# Patient Record
Sex: MALE | Race: WHITE | NOT HISPANIC OR LATINO | Employment: FULL TIME | URBAN - METROPOLITAN AREA
[De-identification: names, ages, dates, MRNs, and addresses within clinical notes are randomized per-mention and may not be internally consistent; named-entity substitution may affect disease eponyms.]

---

## 2018-10-31 ENCOUNTER — HOSPITAL ENCOUNTER (EMERGENCY)
Facility: HOSPITAL | Age: 38
Discharge: HOME | End: 2018-10-31
Attending: EMERGENCY MEDICINE

## 2018-10-31 VITALS
WEIGHT: 260 LBS | TEMPERATURE: 99 F | BODY MASS INDEX: 39.4 KG/M2 | HEIGHT: 68 IN | HEART RATE: 102 BPM | OXYGEN SATURATION: 96 % | RESPIRATION RATE: 20 BRPM | SYSTOLIC BLOOD PRESSURE: 146 MMHG | DIASTOLIC BLOOD PRESSURE: 93 MMHG

## 2018-10-31 DIAGNOSIS — S61.209A FINGER AVULSION, INITIAL ENCOUNTER: Primary | ICD-10-CM

## 2018-10-31 PROCEDURE — 63600175 PHARM REV CODE 636 W HCPCS

## 2018-10-31 PROCEDURE — 90715 TDAP VACCINE 7 YRS/> IM: CPT

## 2018-10-31 PROCEDURE — 90471 IMMUNIZATION ADMIN: CPT

## 2018-10-31 PROCEDURE — 25000003 PHARM REV CODE 250: Performed by: NURSE PRACTITIONER

## 2018-10-31 PROCEDURE — 99283 EMERGENCY DEPT VISIT LOW MDM: CPT

## 2018-10-31 RX ORDER — IBUPROFEN 600 MG/1
600 TABLET ORAL
Status: COMPLETED | OUTPATIENT
Start: 2018-10-31 | End: 2018-10-31

## 2018-10-31 RX ORDER — CEPHALEXIN 250 MG/1
500 CAPSULE ORAL
Status: COMPLETED | OUTPATIENT
Start: 2018-10-31 | End: 2018-10-31

## 2018-10-31 RX ORDER — CEPHALEXIN 250 MG/1
CAPSULE ORAL
Status: DISCONTINUED
Start: 2018-10-31 | End: 2018-10-31 | Stop reason: HOSPADM

## 2018-10-31 RX ORDER — CEPHALEXIN 500 MG/1
500 CAPSULE ORAL 4 TIMES DAILY
Qty: 20 CAPSULE | Refills: 0 | Status: SHIPPED | OUTPATIENT
Start: 2018-10-31 | End: 2018-11-05

## 2018-10-31 RX ADMIN — CLOSTRIDIUM TETANI TOXOID ANTIGEN (FORMALDEHYDE INACTIVATED), CORYNEBACTERIUM DIPHTHERIAE TOXOID ANTIGEN (FORMALDEHYDE INACTIVATED), BORDETELLA PERTUSSIS TOXOID ANTIGEN (GLUTARALDEHYDE INACTIVATED), BORDETELLA PERTUSSIS FILAMENTOUS HEMAGGLUTININ ANTIGEN (FORMALDEHYDE INACTIVATED), BORDETELLA PERTUSSIS PERTACTIN ANTIGEN, AND BORDETELLA PERTUSSIS FIMBRIAE 2/3 ANTIGEN 0.5 ML: 5; 2; 2.5; 5; 3; 5 INJECTION, SUSPENSION INTRAMUSCULAR at 05:10

## 2018-10-31 RX ADMIN — IBUPROFEN 600 MG: 600 TABLET, FILM COATED ORAL at 06:10

## 2018-10-31 RX ADMIN — CEPHALEXIN 500 MG: 250 CAPSULE ORAL at 06:10

## 2018-10-31 NOTE — ED NOTES
Pt presented to room 13 with complaint of a laceration to the tip of his finger after being struck with a circular saw.  ABC's intact awake and alert with no distress noted.  No active bleeding at this time.

## 2018-10-31 NOTE — ED PROVIDER NOTES
Encounter Date: 10/31/2018    SCRIBE #1 NOTE: I, Patty Begum, am scribing for, and in the presence of, Jeri Redmond NP.       History     Chief Complaint   Patient presents with    Laceration     Cut left index finger with chop saw.       Time seen by provider: 5:42 PM on 10/31/2018    Dion Hong Jr. is a 38 y.o. male who presents to the ED with an onset of a wound on his left index finger that presented ~1.45 hours ago. He was cutting trim to put on a house when the saw fell on to his finger. The patient denies any other symptoms at this time. His tetanus vaccination is not up to date. No pertinent SHx noted. No known drug allergies noted.      The history is provided by the patient.     Review of patient's allergies indicates:  No Known Allergies  No past medical history on file.  History reviewed. No pertinent surgical history.  History reviewed. No pertinent family history.  Social History     Tobacco Use    Smoking status: Never Smoker   Substance Use Topics    Alcohol use: No     Frequency: Never    Drug use: No     Review of Systems   Constitutional: Negative for fever.   HENT: Negative for sore throat.    Respiratory: Negative for shortness of breath.    Cardiovascular: Negative for chest pain.   Gastrointestinal: Negative for nausea.   Genitourinary: Negative for dysuria.   Musculoskeletal: Negative for back pain.   Skin: Positive for wound. Negative for rash.        Positive for wound on left index finger.   Neurological: Negative for weakness.   Hematological: Does not bruise/bleed easily.       Physical Exam     Initial Vitals   BP Pulse Resp Temp SpO2   10/31/18 1736 10/31/18 1736 10/31/18 1736 10/31/18 1735 10/31/18 1736   (!) 146/93 102 20 98.9 °F (37.2 °C) 96 %      MAP       --                Physical Exam    Nursing note and vitals reviewed.  Constitutional: Vital signs are normal. He appears well-developed and well-nourished.   HENT:   Head: Normocephalic and atraumatic.   Eyes:  Pupils are equal, round, and reactive to light.   Neck: Neck supple.   Cardiovascular: Normal rate, regular rhythm, normal heart sounds and intact distal pulses. Exam reveals no gallop and no friction rub.    No murmur heard.  Pulmonary/Chest: Breath sounds normal. He has no wheezes. He has no rhonchi. He has no rales.   Abdominal: Normal appearance.   Musculoskeletal:        Left hand: He exhibits tenderness. He exhibits normal range of motion, normal two-point discrimination, normal capillary refill, no deformity and no swelling. Normal sensation noted.   Approximately 0.5 cm Avulsion injury to pulp of the left index finger. No visible bone or tendon involvement. Adequate hemostasis.  Not involving the nail and not circumferential. Normal flexion and extension of left index figner. No swelling. No visible or palpable foreign body. See image attached.    Neurological: He is alert and oriented to person, place, and time. He has normal strength.   Skin: Skin is warm, dry and intact. Capillary refill takes less than 2 seconds.   Psychiatric: He has a normal mood and affect. His speech is normal and behavior is normal.         ED Course   Procedures  Labs Reviewed - No data to display       Imaging Results          X-Ray Finger 2 or More Views Left (Final result)  Result time 11/01/18 09:29:27    Final result by Vishnu Lucio Jr., MD (11/01/18 09:29:27)                 Impression:      Soft tissue loss from the tip of the left index finger without fracture or loss at the digital tuft.      Electronically signed by: Vishnu Lucio MD  Date:    11/01/2018  Time:    09:29             Narrative:    EXAMINATION:  XR FINGER 2 OR MORE VIEWS LEFT    CLINICAL HISTORY:  laceration;    TECHNIQUE:  Three views of the left index finger are provided.    COMPARISON:  None    FINDINGS:  There is soft tissue injury to the tip of the left index finger.  Bone loss or fracture of the digital tuft is not seen.  No foreign body is  noted.                                 Medical Decision Making:   History:   Old Medical Records: I decided to obtain old medical records.  Differential Diagnosis:   Laceration  Tuft fracture  Avulsion   Clinical Tests:   Radiological Study: Ordered and Reviewed       APC / Resident Notes:   Patient is a 38 y.o. male who presents to the ED 11/01/2018 who underwent emergent evaluation for avulsion to left index finger without bone or tendon or nail bed involvement. Adequate hemostasis is obtained. No visible or palpable FB. Xray of left index finger without tuft fracture or bony injury noted. Patient has normal flexion and extension of finger with normal strength and sensation and < 2 second capillary refill; there are no signs of neurovascular compromise or infection. Patient is initiated on Keflex prophylactically in ED; I do no think emergent IV antiobiotics are indicated at this time. Wound is thoroughly cleansed and wound glue is applied per patient request to protect wound bed. Discussed with patient that there will be scarring; he verbalized understanding. Tetanus is updated and patient is given antiinflammatories for pain provided in ED. Antibiotic ointment and protective splint is applied to left index finger tip. Patient is referred to orthopedic.  Based on my clinical evaluation, I do not appreciate any immediate, emergent, or life threatening condition or etiology that warrants additional workup today and feel that the patient can be discharged with close follow up care with orthopedic.  Case discussed with Dr. Cortes who also evaluated patient and who is agreeable to plan of care. Follow up and return precautions discussed; patient verbalized understanding and is agreeable to plan of care. Patient discharged home in stable condition.               Scribe Attestation:   Scribe #1: I performed the above scribed service and the documentation accurately describes the services I performed. I attest to the  accuracy of the note.    Attending Attestation:           Physician Attestation for Scribe:  Physician Attestation Statement for Scribe #1: I, Jeri Redmond, reviewed documentation, as scribed by in my presence, and it is both accurate and complete.     Comments: I, PATTI Baltazar, personally performed the services described in this documentation. All medical record entries made by the scribe were at my direction and in my presence.  I have reviewed the chart and agree that the record reflects my personal performance and is accurate and complete. PATTI Baltazar.  11:58 AM 11/01/2018                Clinical Impression:   The encounter diagnosis was Finger avulsion, initial encounter.      Disposition:   Disposition: Discharged  Condition: Stable                        Jeri Redmond NP  11/01/18 1159

## 2019-04-03 ENCOUNTER — HOSPITAL ENCOUNTER (INPATIENT)
Facility: HOSPITAL | Age: 39
LOS: 1 days | Discharge: HOME | DRG: 603 | End: 2019-04-04
Attending: EMERGENCY MEDICINE | Admitting: INTERNAL MEDICINE

## 2019-04-03 DIAGNOSIS — M25.569 KNEE PAIN: ICD-10-CM

## 2019-04-03 DIAGNOSIS — R50.9 FEVER: ICD-10-CM

## 2019-04-03 DIAGNOSIS — L03.116 CELLULITIS OF LEFT LOWER EXTREMITY: Primary | ICD-10-CM

## 2019-04-03 PROBLEM — A41.9 SEPSIS: Status: ACTIVE | Noted: 2019-04-03

## 2019-04-03 PROBLEM — E66.09 CLASS 2 OBESITY DUE TO EXCESS CALORIES WITHOUT SERIOUS COMORBIDITY WITH BODY MASS INDEX (BMI) OF 39.0 TO 39.9 IN ADULT: Status: ACTIVE | Noted: 2019-04-03

## 2019-04-03 PROBLEM — E66.01 MORBID OBESITY WITH BMI OF 40.0-44.9, ADULT: Status: ACTIVE | Noted: 2019-04-03

## 2019-04-03 LAB
ANION GAP SERPL CALC-SCNC: 11 MMOL/L (ref 8–16)
BACTERIA #/AREA URNS HPF: ABNORMAL /HPF
BASOPHILS # BLD AUTO: 0 K/UL (ref 0–0.2)
BASOPHILS NFR BLD: 0.3 % (ref 0–1.9)
BILIRUB UR QL STRIP: NEGATIVE
BUN SERPL-MCNC: 13 MG/DL (ref 6–20)
CALCIUM SERPL-MCNC: 9.8 MG/DL (ref 8.7–10.5)
CHLORIDE SERPL-SCNC: 102 MMOL/L (ref 95–110)
CLARITY UR: CLEAR
CO2 SERPL-SCNC: 25 MMOL/L (ref 23–29)
COLOR UR: YELLOW
CREAT SERPL-MCNC: 0.8 MG/DL (ref 0.5–1.4)
DIFFERENTIAL METHOD: ABNORMAL
EOSINOPHIL # BLD AUTO: 0 K/UL (ref 0–0.5)
EOSINOPHIL NFR BLD: 0.4 % (ref 0–8)
ERYTHROCYTE [DISTWIDTH] IN BLOOD BY AUTOMATED COUNT: 12.9 % (ref 11.5–14.5)
EST. GFR  (AFRICAN AMERICAN): >60 ML/MIN/1.73 M^2
EST. GFR  (NON AFRICAN AMERICAN): >60 ML/MIN/1.73 M^2
GLUCOSE SERPL-MCNC: 69 MG/DL (ref 70–110)
GLUCOSE UR QL STRIP: NEGATIVE
HCT VFR BLD AUTO: 43 % (ref 40–54)
HGB BLD-MCNC: 14.2 G/DL (ref 14–18)
HGB UR QL STRIP: ABNORMAL
KETONES UR QL STRIP: NEGATIVE
LACTATE SERPL-SCNC: 1.4 MMOL/L (ref 0.5–2.2)
LEUKOCYTE ESTERASE UR QL STRIP: NEGATIVE
LYMPHOCYTES # BLD AUTO: 2 K/UL (ref 1–4.8)
LYMPHOCYTES NFR BLD: 21.1 % (ref 18–48)
MCH RBC QN AUTO: 29.8 PG (ref 27–31)
MCHC RBC AUTO-ENTMCNC: 33 G/DL (ref 32–36)
MCV RBC AUTO: 91 FL (ref 82–98)
MICROSCOPIC COMMENT: ABNORMAL
MONOCYTES # BLD AUTO: 1 K/UL (ref 0.3–1)
MONOCYTES NFR BLD: 10.1 % (ref 4–15)
NEUTROPHILS # BLD AUTO: 6.5 K/UL (ref 1.8–7.7)
NEUTROPHILS NFR BLD: 68.1 % (ref 38–73)
NITRITE UR QL STRIP: NEGATIVE
PH UR STRIP: 6 [PH] (ref 5–8)
PLATELET # BLD AUTO: 270 K/UL (ref 150–350)
PMV BLD AUTO: 7.1 FL (ref 9.2–12.9)
POTASSIUM SERPL-SCNC: 3.6 MMOL/L (ref 3.5–5.1)
PROT UR QL STRIP: NEGATIVE
RBC # BLD AUTO: 4.75 M/UL (ref 4.6–6.2)
RBC #/AREA URNS HPF: 5 /HPF (ref 0–4)
SODIUM SERPL-SCNC: 138 MMOL/L (ref 136–145)
SP GR UR STRIP: 1.02 (ref 1–1.03)
SQUAMOUS #/AREA URNS HPF: 1 /HPF
URN SPEC COLLECT METH UR: ABNORMAL
UROBILINOGEN UR STRIP-ACNC: NEGATIVE EU/DL
WBC # BLD AUTO: 9.5 K/UL (ref 3.9–12.7)
WBC #/AREA URNS HPF: 0 /HPF (ref 0–5)

## 2019-04-03 PROCEDURE — 36415 COLL VENOUS BLD VENIPUNCTURE: CPT

## 2019-04-03 PROCEDURE — 25000003 PHARM REV CODE 250: Performed by: NURSE PRACTITIONER

## 2019-04-03 PROCEDURE — 83605 ASSAY OF LACTIC ACID: CPT

## 2019-04-03 PROCEDURE — 93005 ELECTROCARDIOGRAM TRACING: CPT

## 2019-04-03 PROCEDURE — 63600175 PHARM REV CODE 636 W HCPCS: Performed by: EMERGENCY MEDICINE

## 2019-04-03 PROCEDURE — 86140 C-REACTIVE PROTEIN: CPT

## 2019-04-03 PROCEDURE — 87040 BLOOD CULTURE FOR BACTERIA: CPT | Mod: 59

## 2019-04-03 PROCEDURE — 25000003 PHARM REV CODE 250: Performed by: EMERGENCY MEDICINE

## 2019-04-03 PROCEDURE — 85025 COMPLETE CBC W/AUTO DIFF WBC: CPT

## 2019-04-03 PROCEDURE — 12000002 HC ACUTE/MED SURGE SEMI-PRIVATE ROOM

## 2019-04-03 PROCEDURE — 81000 URINALYSIS NONAUTO W/SCOPE: CPT

## 2019-04-03 PROCEDURE — 96374 THER/PROPH/DIAG INJ IV PUSH: CPT

## 2019-04-03 PROCEDURE — 80048 BASIC METABOLIC PNL TOTAL CA: CPT

## 2019-04-03 PROCEDURE — 99285 EMERGENCY DEPT VISIT HI MDM: CPT | Mod: 25

## 2019-04-03 PROCEDURE — 63600175 PHARM REV CODE 636 W HCPCS: Performed by: NURSE PRACTITIONER

## 2019-04-03 RX ORDER — GLUCAGON 1 MG
1 KIT INJECTION
Status: DISCONTINUED | OUTPATIENT
Start: 2019-04-03 | End: 2019-04-04 | Stop reason: HOSPADM

## 2019-04-03 RX ORDER — POTASSIUM CHLORIDE 20 MEQ/15ML
40 SOLUTION ORAL
Status: DISCONTINUED | OUTPATIENT
Start: 2019-04-03 | End: 2019-04-04 | Stop reason: HOSPADM

## 2019-04-03 RX ORDER — ONDANSETRON 2 MG/ML
8 INJECTION INTRAMUSCULAR; INTRAVENOUS EVERY 8 HOURS PRN
Status: DISCONTINUED | OUTPATIENT
Start: 2019-04-03 | End: 2019-04-04 | Stop reason: HOSPADM

## 2019-04-03 RX ORDER — SODIUM CHLORIDE 0.9 % (FLUSH) 0.9 %
10 SYRINGE (ML) INJECTION
Status: DISCONTINUED | OUTPATIENT
Start: 2019-04-03 | End: 2019-04-04 | Stop reason: HOSPADM

## 2019-04-03 RX ORDER — LANOLIN ALCOHOL/MO/W.PET/CERES
800 CREAM (GRAM) TOPICAL
Status: DISCONTINUED | OUTPATIENT
Start: 2019-04-03 | End: 2019-04-04 | Stop reason: HOSPADM

## 2019-04-03 RX ORDER — ENOXAPARIN SODIUM 100 MG/ML
40 INJECTION SUBCUTANEOUS EVERY 24 HOURS
Status: DISCONTINUED | OUTPATIENT
Start: 2019-04-03 | End: 2019-04-04 | Stop reason: HOSPADM

## 2019-04-03 RX ORDER — VANCOMYCIN HCL IN 5 % DEXTROSE 1G/250ML
1000 PLASTIC BAG, INJECTION (ML) INTRAVENOUS
Status: DISPENSED | OUTPATIENT
Start: 2019-04-03 | End: 2019-04-04

## 2019-04-03 RX ORDER — ACETAMINOPHEN 325 MG/1
650 TABLET ORAL
Status: COMPLETED | OUTPATIENT
Start: 2019-04-03 | End: 2019-04-03

## 2019-04-03 RX ORDER — AMOXICILLIN 250 MG
1 CAPSULE ORAL 2 TIMES DAILY
Status: DISCONTINUED | OUTPATIENT
Start: 2019-04-03 | End: 2019-04-04 | Stop reason: HOSPADM

## 2019-04-03 RX ORDER — IBUPROFEN 400 MG/1
400 TABLET ORAL EVERY 6 HOURS PRN
Status: DISCONTINUED | OUTPATIENT
Start: 2019-04-03 | End: 2019-04-04 | Stop reason: HOSPADM

## 2019-04-03 RX ORDER — SODIUM CHLORIDE 9 MG/ML
1000 INJECTION, SOLUTION INTRAVENOUS
Status: COMPLETED | OUTPATIENT
Start: 2019-04-03 | End: 2019-04-03

## 2019-04-03 RX ORDER — ACETAMINOPHEN 500 MG
1000 TABLET ORAL EVERY 6 HOURS PRN
Status: DISCONTINUED | OUTPATIENT
Start: 2019-04-03 | End: 2019-04-04 | Stop reason: HOSPADM

## 2019-04-03 RX ORDER — SODIUM CHLORIDE 9 MG/ML
INJECTION, SOLUTION INTRAVENOUS CONTINUOUS
Status: DISCONTINUED | OUTPATIENT
Start: 2019-04-03 | End: 2019-04-04 | Stop reason: HOSPADM

## 2019-04-03 RX ORDER — IBUPROFEN 200 MG
16 TABLET ORAL
Status: DISCONTINUED | OUTPATIENT
Start: 2019-04-03 | End: 2019-04-04 | Stop reason: HOSPADM

## 2019-04-03 RX ORDER — IBUPROFEN 200 MG
24 TABLET ORAL
Status: DISCONTINUED | OUTPATIENT
Start: 2019-04-03 | End: 2019-04-04 | Stop reason: HOSPADM

## 2019-04-03 RX ORDER — POTASSIUM CHLORIDE 20 MEQ/15ML
60 SOLUTION ORAL
Status: DISCONTINUED | OUTPATIENT
Start: 2019-04-03 | End: 2019-04-04 | Stop reason: HOSPADM

## 2019-04-03 RX ADMIN — SODIUM CHLORIDE 1000 ML: 0.9 INJECTION, SOLUTION INTRAVENOUS at 07:04

## 2019-04-03 RX ADMIN — ACETAMINOPHEN 650 MG: 325 TABLET ORAL at 07:04

## 2019-04-03 RX ADMIN — CEFTRIAXONE 1 G: 1 INJECTION, SOLUTION INTRAVENOUS at 10:04

## 2019-04-03 RX ADMIN — ENOXAPARIN SODIUM 40 MG: 100 INJECTION SUBCUTANEOUS at 09:04

## 2019-04-03 RX ADMIN — SODIUM CHLORIDE: 0.9 INJECTION, SOLUTION INTRAVENOUS at 08:04

## 2019-04-03 NOTE — ED PROVIDER NOTES
"Encounter Date: 4/3/2019    SCRIBE #1 NOTE: I, Catherine Leon , am scribing for, and in the presence of,  Dr. Cuello. I have scribed the entire note.       History     Chief Complaint   Patient presents with    Cellulitis     left knee x 1 week      04/03/2019  6:45 PM       The patient is a 39 y.o. male who is presenting with the gradual onset of cellulitis to the LLE that began x 1 week ago s/p "kneeling down" on something at a construction site. The pt endorses worsening erythema, swelling, and tenderness that began just inferior to his L knee and has since spread to the distal LLE. No mitigaitng or exacerbating factors. Associated sxs include chills, nausea, fever and body aches. No pertinent PMhx. No pertinent past surgical hx.               The history is provided by the patient and medical records.     Review of patient's allergies indicates:  No Known Allergies  History reviewed. No pertinent past medical history.  History reviewed. No pertinent surgical history.  History reviewed. No pertinent family history.  Social History     Tobacco Use    Smoking status: Never Smoker   Substance Use Topics    Alcohol use: No     Frequency: Never    Drug use: No     Review of Systems   Constitutional: Positive for chills and fever. Negative for activity change, appetite change and fatigue.   Eyes: Negative for visual disturbance.   Respiratory: Negative for apnea and shortness of breath.    Cardiovascular: Negative for chest pain and palpitations.   Gastrointestinal: Positive for nausea. Negative for abdominal distention and abdominal pain.   Genitourinary: Negative for difficulty urinating.   Musculoskeletal: Positive for myalgias. Negative for neck pain.   Skin: Positive for color change and wound. Negative for pallor and rash.        +swelling      Neurological: Negative for headaches.   Hematological: Does not bruise/bleed easily.   Psychiatric/Behavioral: Negative for agitation.       Physical Exam     Initial " Vitals [04/03/19 1752]   BP Pulse Resp Temp SpO2   132/77 (!) 127 20 (!) 101.1 °F (38.4 °C) 96 %      MAP       --         Physical Exam    Nursing note and vitals reviewed.  Constitutional: He appears well-developed and well-nourished.   HENT:   Head: Normocephalic and atraumatic.   Eyes: Conjunctivae and EOM are normal. Pupils are equal, round, and reactive to light.   Neck: Normal range of motion. Neck supple.   Cardiovascular: Regular rhythm, normal heart sounds and intact distal pulses. Exam reveals no gallop and no friction rub.    No murmur heard.  Tachycardic rate    Pulmonary/Chest: Breath sounds normal. No respiratory distress. He has no wheezes. He has no rhonchi. He has no rales.   Abdominal: Soft. He exhibits no distension. There is no tenderness.   Musculoskeletal: Normal range of motion. He exhibits tenderness. He exhibits no edema.   Neurological: He is alert and oriented to person, place, and time.   Skin: Skin is warm and dry. There is erythema.   2 by 2cm tender nodule present just overlying the L patellar tendon. There is centralized area of umbilicated fluctuance noted with erythema and calor present inferior to the LLE, sparing the thigh.    Psychiatric: He has a normal mood and affect.         ED Course   Procedures  Labs Reviewed   BASIC METABOLIC PANEL - Abnormal; Notable for the following components:       Result Value    Glucose 69 (*)     All other components within normal limits   CBC W/ AUTO DIFFERENTIAL - Abnormal; Notable for the following components:    MPV 7.1 (*)     All other components within normal limits   CULTURE, BLOOD   CULTURE, BLOOD   LACTIC ACID, PLASMA   URINALYSIS, REFLEX TO URINE CULTURE     EKG Readings: (Independently Interpreted)   Heart Rate: 119.   Sinus tachycardia. Normal axis, normal intervals. No ST segment elevation or depression. No T wave inversions.        Imaging Results          X-Ray Knee 3 View Left (In process)                X-Ray Chest AP Portable  (In process)                  Medical Decision Making:   History:   Old Medical Records: I decided to obtain old medical records.  Independently Interpreted Test(s):   I have ordered and independently interpreted X-rays - see prior notes.  I have ordered and independently interpreted EKG Reading(s) - see prior notes  Clinical Tests:   Lab Tests: Ordered and Reviewed  Radiological Study: Ordered and Reviewed  Medical Tests: Ordered and Reviewed  ED Management:  39-year-old male presents with a one-week history of worsening left leg pain and redness.  It began on the knee with a superficial abrasion and now involves the entire left lower leg.  He has tachycardia and fever but no other evidence of sepsis.  He will be admitted for IV antibiotics.  Other:   I have discussed this case with another health care provider.       <> Summary of the Discussion: Discussed with Jeri gill who will admit the patient       APC / Resident Notes:   I, Dr. Dave Cuello III, personally performed the services described in this documentation. All medical record entries made by the scribe were at my direction and in my presence.  I have reviewed the chart and agree that the record reflects my personal performance and is accurate and complete       Scribe Attestation:   Scribe #1: I performed the above scribed service and the documentation accurately describes the services I performed. I attest to the accuracy of the note.               Clinical Impression:       ICD-10-CM ICD-9-CM   1. Cellulitis of left lower extremity L03.116 682.6   2. Knee pain M25.569 719.46   3. Fever R50.9 780.60         Disposition:   Disposition: Admitted                        Dave Cuello III, MD  04/03/19 5881

## 2019-04-04 ENCOUNTER — ANESTHESIA EVENT (OUTPATIENT)
Dept: SURGERY | Facility: HOSPITAL | Age: 39
DRG: 603 | End: 2019-04-04

## 2019-04-04 ENCOUNTER — ANESTHESIA (OUTPATIENT)
Dept: SURGERY | Facility: HOSPITAL | Age: 39
DRG: 603 | End: 2019-04-04

## 2019-04-04 VITALS
HEIGHT: 68 IN | HEART RATE: 96 BPM | SYSTOLIC BLOOD PRESSURE: 152 MMHG | OXYGEN SATURATION: 99 % | BODY MASS INDEX: 41.5 KG/M2 | DIASTOLIC BLOOD PRESSURE: 96 MMHG | RESPIRATION RATE: 16 BRPM | WEIGHT: 273.81 LBS | TEMPERATURE: 99 F

## 2019-04-04 LAB
ALBUMIN SERPL BCP-MCNC: 3.3 G/DL (ref 3.5–5.2)
ALP SERPL-CCNC: 50 U/L (ref 55–135)
ALT SERPL W/O P-5'-P-CCNC: 29 U/L (ref 10–44)
ANION GAP SERPL CALC-SCNC: 8 MMOL/L (ref 8–16)
AST SERPL-CCNC: 22 U/L (ref 10–40)
BASOPHILS # BLD AUTO: 0 K/UL (ref 0–0.2)
BASOPHILS NFR BLD: 0.3 % (ref 0–1.9)
BILIRUB SERPL-MCNC: 0.5 MG/DL (ref 0.1–1)
BUN SERPL-MCNC: 11 MG/DL (ref 6–20)
CALCIUM SERPL-MCNC: 8.9 MG/DL (ref 8.7–10.5)
CHLORIDE SERPL-SCNC: 103 MMOL/L (ref 95–110)
CO2 SERPL-SCNC: 26 MMOL/L (ref 23–29)
CREAT SERPL-MCNC: 0.8 MG/DL (ref 0.5–1.4)
CRP SERPL-MCNC: 90.5 MG/L (ref 0–8.2)
DIFFERENTIAL METHOD: ABNORMAL
EOSINOPHIL # BLD AUTO: 0 K/UL (ref 0–0.5)
EOSINOPHIL NFR BLD: 0.6 % (ref 0–8)
ERYTHROCYTE [DISTWIDTH] IN BLOOD BY AUTOMATED COUNT: 13.1 % (ref 11.5–14.5)
EST. GFR  (AFRICAN AMERICAN): >60 ML/MIN/1.73 M^2
EST. GFR  (NON AFRICAN AMERICAN): >60 ML/MIN/1.73 M^2
GLUCOSE SERPL-MCNC: 119 MG/DL (ref 70–110)
HCT VFR BLD AUTO: 39.4 % (ref 40–54)
HGB BLD-MCNC: 13 G/DL (ref 14–18)
LACTATE SERPL-SCNC: 1.2 MMOL/L (ref 0.5–2.2)
LYMPHOCYTES # BLD AUTO: 1.7 K/UL (ref 1–4.8)
LYMPHOCYTES NFR BLD: 22.2 % (ref 18–48)
MAGNESIUM SERPL-MCNC: 2.1 MG/DL (ref 1.6–2.6)
MCH RBC QN AUTO: 30.1 PG (ref 27–31)
MCHC RBC AUTO-ENTMCNC: 33 G/DL (ref 32–36)
MCV RBC AUTO: 91 FL (ref 82–98)
MONOCYTES # BLD AUTO: 0.8 K/UL (ref 0.3–1)
MONOCYTES NFR BLD: 9.9 % (ref 4–15)
NEUTROPHILS # BLD AUTO: 5.1 K/UL (ref 1.8–7.7)
NEUTROPHILS NFR BLD: 67 % (ref 38–73)
PLATELET # BLD AUTO: 230 K/UL (ref 150–350)
PMV BLD AUTO: 7.4 FL (ref 9.2–12.9)
POTASSIUM SERPL-SCNC: 3.7 MMOL/L (ref 3.5–5.1)
PROT SERPL-MCNC: 6.8 G/DL (ref 6–8.4)
RBC # BLD AUTO: 4.32 M/UL (ref 4.6–6.2)
SODIUM SERPL-SCNC: 137 MMOL/L (ref 136–145)
VANCOMYCIN TROUGH SERPL-MCNC: 9.8 UG/ML (ref 10–22)
WBC # BLD AUTO: 7.6 K/UL (ref 3.9–12.7)

## 2019-04-04 PROCEDURE — 27301 PR INCIS/DRAIN THIGH/KNEE ABSCESS,DEEP: ICD-10-PCS | Mod: LT,,, | Performed by: ORTHOPAEDIC SURGERY

## 2019-04-04 PROCEDURE — G8989 SELF CARE D/C STATUS: HCPCS | Mod: CH

## 2019-04-04 PROCEDURE — 99233 PR SUBSEQUENT HOSPITAL CARE,LEVL III: ICD-10-PCS | Mod: 57,,, | Performed by: ORTHOPAEDIC SURGERY

## 2019-04-04 PROCEDURE — 80053 COMPREHEN METABOLIC PANEL: CPT

## 2019-04-04 PROCEDURE — 87070 CULTURE OTHR SPECIMN AEROBIC: CPT

## 2019-04-04 PROCEDURE — 87077 CULTURE AEROBIC IDENTIFY: CPT

## 2019-04-04 PROCEDURE — 63600175 PHARM REV CODE 636 W HCPCS: Performed by: NURSE ANESTHETIST, CERTIFIED REGISTERED

## 2019-04-04 PROCEDURE — 87075 CULTR BACTERIA EXCEPT BLOOD: CPT

## 2019-04-04 PROCEDURE — 63600175 PHARM REV CODE 636 W HCPCS: Performed by: INTERNAL MEDICINE

## 2019-04-04 PROCEDURE — D9220A PRA ANESTHESIA: Mod: ,,, | Performed by: ANESTHESIOLOGY

## 2019-04-04 PROCEDURE — G8988 SELF CARE GOAL STATUS: HCPCS | Mod: CH

## 2019-04-04 PROCEDURE — G8978 MOBILITY CURRENT STATUS: HCPCS | Mod: CH

## 2019-04-04 PROCEDURE — 99233 SBSQ HOSP IP/OBS HIGH 50: CPT | Mod: 57,,, | Performed by: ORTHOPAEDIC SURGERY

## 2019-04-04 PROCEDURE — 37000009 HC ANESTHESIA EA ADD 15 MINS: Performed by: ORTHOPAEDIC SURGERY

## 2019-04-04 PROCEDURE — 71000039 HC RECOVERY, EACH ADD'L HOUR: Performed by: ORTHOPAEDIC SURGERY

## 2019-04-04 PROCEDURE — 80202 ASSAY OF VANCOMYCIN: CPT

## 2019-04-04 PROCEDURE — 97161 PT EVAL LOW COMPLEX 20 MIN: CPT

## 2019-04-04 PROCEDURE — 97165 OT EVAL LOW COMPLEX 30 MIN: CPT

## 2019-04-04 PROCEDURE — G8979 MOBILITY GOAL STATUS: HCPCS | Mod: CH

## 2019-04-04 PROCEDURE — 36415 COLL VENOUS BLD VENIPUNCTURE: CPT

## 2019-04-04 PROCEDURE — 99900103 DSU ONLY-NO CHARGE-INITIAL HR (STAT): Performed by: ORTHOPAEDIC SURGERY

## 2019-04-04 PROCEDURE — G8987 SELF CARE CURRENT STATUS: HCPCS | Mod: CH

## 2019-04-04 PROCEDURE — 25000003 PHARM REV CODE 250: Performed by: INTERNAL MEDICINE

## 2019-04-04 PROCEDURE — 25000003 PHARM REV CODE 250: Performed by: ANESTHESIOLOGY

## 2019-04-04 PROCEDURE — D9220A PRA ANESTHESIA: ICD-10-PCS | Mod: ,,, | Performed by: ANESTHESIOLOGY

## 2019-04-04 PROCEDURE — 27301 DRAIN THIGH/KNEE LESION: CPT | Mod: LT,,, | Performed by: ORTHOPAEDIC SURGERY

## 2019-04-04 PROCEDURE — G8980 MOBILITY D/C STATUS: HCPCS | Mod: CH

## 2019-04-04 PROCEDURE — 63600175 PHARM REV CODE 636 W HCPCS: Performed by: ANESTHESIOLOGY

## 2019-04-04 PROCEDURE — 36000704 HC OR TIME LEV I 1ST 15 MIN: Performed by: ORTHOPAEDIC SURGERY

## 2019-04-04 PROCEDURE — 37000008 HC ANESTHESIA 1ST 15 MINUTES: Performed by: ORTHOPAEDIC SURGERY

## 2019-04-04 PROCEDURE — 87186 SC STD MICRODIL/AGAR DIL: CPT

## 2019-04-04 PROCEDURE — 25000003 PHARM REV CODE 250: Performed by: NURSE PRACTITIONER

## 2019-04-04 PROCEDURE — 63600175 PHARM REV CODE 636 W HCPCS: Performed by: NURSE PRACTITIONER

## 2019-04-04 PROCEDURE — 85025 COMPLETE CBC W/AUTO DIFF WBC: CPT

## 2019-04-04 PROCEDURE — 94761 N-INVAS EAR/PLS OXIMETRY MLT: CPT

## 2019-04-04 PROCEDURE — 36000705 HC OR TIME LEV I EA ADD 15 MIN: Performed by: ORTHOPAEDIC SURGERY

## 2019-04-04 PROCEDURE — 71000033 HC RECOVERY, INTIAL HOUR: Performed by: ORTHOPAEDIC SURGERY

## 2019-04-04 PROCEDURE — 83735 ASSAY OF MAGNESIUM: CPT

## 2019-04-04 RX ORDER — FENTANYL CITRATE 50 UG/ML
25 INJECTION, SOLUTION INTRAMUSCULAR; INTRAVENOUS EVERY 5 MIN PRN
Status: DISCONTINUED | OUTPATIENT
Start: 2019-04-04 | End: 2019-04-04 | Stop reason: HOSPADM

## 2019-04-04 RX ORDER — SODIUM CHLORIDE, SODIUM LACTATE, POTASSIUM CHLORIDE, CALCIUM CHLORIDE 600; 310; 30; 20 MG/100ML; MG/100ML; MG/100ML; MG/100ML
INJECTION, SOLUTION INTRAVENOUS CONTINUOUS
Status: DISCONTINUED | OUTPATIENT
Start: 2019-04-04 | End: 2019-04-04

## 2019-04-04 RX ORDER — MIDAZOLAM HYDROCHLORIDE 1 MG/ML
INJECTION INTRAMUSCULAR; INTRAVENOUS
Status: DISCONTINUED | OUTPATIENT
Start: 2019-04-04 | End: 2019-04-04

## 2019-04-04 RX ORDER — LIDOCAINE HCL/PF 100 MG/5ML
SYRINGE (ML) INTRAVENOUS
Status: DISCONTINUED | OUTPATIENT
Start: 2019-04-04 | End: 2019-04-04

## 2019-04-04 RX ORDER — FENTANYL CITRATE 50 UG/ML
INJECTION, SOLUTION INTRAMUSCULAR; INTRAVENOUS
Status: DISCONTINUED | OUTPATIENT
Start: 2019-04-04 | End: 2019-04-04

## 2019-04-04 RX ORDER — IBUPROFEN 400 MG/1
400 TABLET ORAL EVERY 6 HOURS PRN
Start: 2019-04-04

## 2019-04-04 RX ORDER — OXYCODONE HYDROCHLORIDE 5 MG/1
5 TABLET ORAL ONCE AS NEEDED
Status: COMPLETED | OUTPATIENT
Start: 2019-04-04 | End: 2019-04-04

## 2019-04-04 RX ORDER — ONDANSETRON 2 MG/ML
4 INJECTION INTRAMUSCULAR; INTRAVENOUS ONCE AS NEEDED
Status: COMPLETED | OUTPATIENT
Start: 2019-04-04 | End: 2019-04-04

## 2019-04-04 RX ORDER — PROPOFOL 10 MG/ML
VIAL (ML) INTRAVENOUS
Status: DISCONTINUED | OUTPATIENT
Start: 2019-04-04 | End: 2019-04-04

## 2019-04-04 RX ORDER — CLINDAMYCIN HYDROCHLORIDE 300 MG/1
300 CAPSULE ORAL EVERY 8 HOURS
Qty: 21 CAPSULE | Refills: 0 | Status: SHIPPED | OUTPATIENT
Start: 2019-04-04 | End: 2019-04-11

## 2019-04-04 RX ORDER — SODIUM CHLORIDE, SODIUM LACTATE, POTASSIUM CHLORIDE, CALCIUM CHLORIDE 600; 310; 30; 20 MG/100ML; MG/100ML; MG/100ML; MG/100ML
125 INJECTION, SOLUTION INTRAVENOUS CONTINUOUS
Status: DISCONTINUED | OUTPATIENT
Start: 2019-04-04 | End: 2019-04-04 | Stop reason: HOSPADM

## 2019-04-04 RX ORDER — DEXAMETHASONE SODIUM PHOSPHATE 4 MG/ML
INJECTION, SOLUTION INTRA-ARTICULAR; INTRALESIONAL; INTRAMUSCULAR; INTRAVENOUS; SOFT TISSUE
Status: DISCONTINUED | OUTPATIENT
Start: 2019-04-04 | End: 2019-04-04

## 2019-04-04 RX ADMIN — MIDAZOLAM HYDROCHLORIDE 2 MG: 1 INJECTION, SOLUTION INTRAMUSCULAR; INTRAVENOUS at 04:04

## 2019-04-04 RX ADMIN — FENTANYL CITRATE 100 MCG: 50 INJECTION, SOLUTION INTRAMUSCULAR; INTRAVENOUS at 04:04

## 2019-04-04 RX ADMIN — LIDOCAINE HYDROCHLORIDE 100 MG: 20 INJECTION, SOLUTION INTRAVENOUS at 04:04

## 2019-04-04 RX ADMIN — ONDANSETRON 4 MG: 2 INJECTION, SOLUTION INTRAMUSCULAR; INTRAVENOUS at 04:04

## 2019-04-04 RX ADMIN — VANCOMYCIN HYDROCHLORIDE 1250 MG: 1 INJECTION, POWDER, FOR SOLUTION INTRAVENOUS at 01:04

## 2019-04-04 RX ADMIN — VANCOMYCIN HYDROCHLORIDE 1250 MG: 1 INJECTION, POWDER, FOR SOLUTION INTRAVENOUS at 08:04

## 2019-04-04 RX ADMIN — IBUPROFEN 400 MG: 400 TABLET, FILM COATED ORAL at 08:04

## 2019-04-04 RX ADMIN — FENTANYL CITRATE 50 MCG: 50 INJECTION, SOLUTION INTRAMUSCULAR; INTRAVENOUS at 04:04

## 2019-04-04 RX ADMIN — SODIUM CHLORIDE, SODIUM LACTATE, POTASSIUM CHLORIDE, AND CALCIUM CHLORIDE: .6; .31; .03; .02 INJECTION, SOLUTION INTRAVENOUS at 03:04

## 2019-04-04 RX ADMIN — DEXAMETHASONE SODIUM PHOSPHATE 4 MG: 4 INJECTION, SOLUTION INTRAMUSCULAR; INTRAVENOUS at 04:04

## 2019-04-04 RX ADMIN — PROPOFOL 250 MG: 10 INJECTION, EMULSION INTRAVENOUS at 04:04

## 2019-04-04 RX ADMIN — VANCOMYCIN HYDROCHLORIDE 1250 MG: 1 INJECTION, POWDER, FOR SOLUTION INTRAVENOUS at 05:04

## 2019-04-04 RX ADMIN — DOCUSATE SODIUM AND SENNOSIDES 1 TABLET: 8.6; 5 TABLET, FILM COATED ORAL at 08:04

## 2019-04-04 RX ADMIN — OXYCODONE HYDROCHLORIDE 5 MG: 5 TABLET ORAL at 05:04

## 2019-04-04 RX ADMIN — ONDANSETRON 4 MG: 2 INJECTION INTRAMUSCULAR; INTRAVENOUS at 05:04

## 2019-04-04 NOTE — PLAN OF CARE
Problem: Adult Inpatient Plan of Care  Goal: Plan of Care Review  Outcome: Ongoing (interventions implemented as appropriate)  Plan of care reviewed with patient. Safety maintained throughout shift. Bed in low and locked position, side rails up x2, call light within reach. Repositions independently. Urinal at bedside. Pt resting in between care. Telemetry monitoring in use. IV fluids infusing per orders. IV antibiotics infused per orders. NPO after midnight. No c/o pain at this time. Hourly/q2 rounding completed for pt safety. Will continue to monitor.

## 2019-04-04 NOTE — SUBJECTIVE & OBJECTIVE
History reviewed. No pertinent past medical history.    History reviewed. No pertinent surgical history.    Review of patient's allergies indicates:  No Known Allergies    No current facility-administered medications on file prior to encounter.      No current outpatient medications on file prior to encounter.     Family History     Problem Relation (Age of Onset)    Diabetes Father    Hyperlipidemia Father    Hypertension Father    Obesity Mother        Tobacco Use    Smoking status: Never Smoker   Substance and Sexual Activity    Alcohol use: No     Frequency: Never    Drug use: No    Sexual activity: Not on file     Review of Systems   Constitutional: Positive for activity change, chills and fever.   HENT: Positive for postnasal drip and sore throat. Negative for congestion, sinus pressure and trouble swallowing.    Eyes: Negative for photophobia and visual disturbance.   Respiratory: Negative for cough, shortness of breath and wheezing.    Cardiovascular: Positive for leg swelling. Negative for chest pain and palpitations.   Gastrointestinal: Positive for nausea. Negative for abdominal distention, abdominal pain, constipation, diarrhea and vomiting.   Genitourinary: Negative for difficulty urinating, dysuria, flank pain, frequency and hematuria.   Musculoskeletal: Positive for arthralgias, gait problem, joint swelling and myalgias.   Skin: Positive for color change and wound.   Neurological: Positive for headaches (mild, not abnormal.). Negative for dizziness, weakness and numbness.   Psychiatric/Behavioral: Negative for confusion. The patient is not nervous/anxious.      Objective:     Vital Signs (Most Recent):  Temp: 99.8 °F (37.7 °C) (04/03/19 2048)  Pulse: (!) 114 (04/03/19 2048)  Resp: 18 (04/03/19 2048)  BP: 128/75 (04/03/19 2048)  SpO2: 98 % (04/03/19 2051) Vital Signs (24h Range):  Temp:  [99.8 °F (37.7 °C)-101.8 °F (38.8 °C)] 99.8 °F (37.7 °C)  Pulse:  [114-127] 114  Resp:  [18-20] 18  SpO2:  [96  %-99 %] 98 %  BP: (128-145)/(62-85) 128/75     Weight: 117.9 kg (260 lb)  Body mass index is 39.53 kg/m².    Physical Exam   Constitutional: He is oriented to person, place, and time. He appears well-developed and well-nourished. No distress.   HENT:   Head: Normocephalic and atraumatic.   Eyes: Pupils are equal, round, and reactive to light. Conjunctivae and EOM are normal.   Neck: Normal range of motion. Neck supple. No thyromegaly present.   Cardiovascular: Regular rhythm, normal heart sounds and intact distal pulses. Tachycardia present.   No murmur heard.  Pulmonary/Chest: Effort normal and breath sounds normal. No respiratory distress.   Diminished breath sounds bilaterally.   Abdominal: Soft. Bowel sounds are normal. He exhibits no distension. There is no tenderness.   obese   Musculoskeletal: Normal range of motion. He exhibits edema and tenderness.   Neurological: He is alert and oriented to person, place, and time. No cranial nerve deficit.   Skin: Skin is warm and dry. Capillary refill takes less than 2 seconds. There is erythema.   LEFT knee and lower aspect of LLE erythematous and taut.  Central area on patella with ~ 3 mm circular area of firmness with healed central lesion.    Psychiatric: He has a normal mood and affect. His behavior is normal. Judgment and thought content normal.         CRANIAL NERVES     CN III, IV, VI   Pupils are equal, round, and reactive to light.  Extraocular motions are normal.        Significant Labs:   CBC:   Recent Labs   Lab 04/03/19  1900   WBC 9.50   HGB 14.2   HCT 43.0        CMP:   Recent Labs   Lab 04/03/19  1900      K 3.6      CO2 25   GLU 69*   BUN 13   CREATININE 0.8   CALCIUM 9.8   ANIONGAP 11   EGFRNONAA >60     Lactic Acid:   Recent Labs   Lab 04/03/19 1948   LACTATE 1.4     Urine Studies:   Recent Labs   Lab 04/03/19 2035   COLORU Yellow   APPEARANCEUA Clear   PHUR 6.0   SPECGRAV 1.020   PROTEINUA Negative   GLUCUA Negative   KETONESU  Negative   BILIRUBINUA Negative   OCCULTUA 1+*   NITRITE Negative   UROBILINOGEN Negative   LEUKOCYTESUR Negative   RBCUA 5*   WBCUA 0   BACTERIA None   SQUAMEPITHEL 1       XR Knee: Diffuse soft tissue swelling involving the left knee.  No evidence of radiopaque foreign body.  The findings may be seen with cellulitis.

## 2019-04-04 NOTE — HOSPITAL COURSE
The patient is a 39-year-old gentleman who was admitted to the hospital for treatment of left lower extremity cellulitis.  He was treated with IV vancomycin and his leg looks a lot better today.  The patient was evaluated by Ortho and they do not think he has a knee infection.  The patient will be discharged home on oral antibiotics.  He will follow up with his PCP in 1 week.

## 2019-04-04 NOTE — HPI
"Dion Hong is a 39-year-old male with PMHx significant for MRSA skin infections.  He presented to the ED with complaints of LLE swelling and redness for the last 3 days.  He states initially he noticed a boil" to his left knee.  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  He noted fluctuance to the area, then eventual redness, swelling, and pain. His pain was worsened with standing on the leg and to touch.  He rated pain at worst 8/10 and is improved with rest.  Associated symptoms include subjective fever and chills, as well as nausea.  He states he is up-to-date on his tetanus.  He does endorse trying to drain the boil himself at home with a brand new 20 gauge needle after cleansing the area with ETOH and witch Hazel.  He reports minimal pus drainage at that time.  He denies any Hx of blood clots or any known Hx of blood clots, though he speculates that his mom and dad may possibly have blood clots.  He denies any chest pain, SOB, abdominal pain, urinary complaints, or numbness/tingling.       "

## 2019-04-04 NOTE — ANESTHESIA PREPROCEDURE EVALUATION
04/04/2019  Dion Hong Jr. is a 39 y.o., male.    Anesthesia Evaluation    I have reviewed the Patient Summary Reports.    I have reviewed the Nursing Notes.   I have reviewed the Medications.     Review of Systems  Anesthesia Hx:  No previous Anesthesia    Social:  Former Smoker    Hematology/Oncology:  Hematology Normal   Oncology Normal     EENT/Dental:EENT/Dental Normal   Cardiovascular:  Cardiovascular Normal     Pulmonary:  Pulmonary Normal    Renal/:  Renal/ Normal     Hepatic/GI:  Hepatic/GI Normal    Musculoskeletal:  Musculoskeletal Normal    Neurological:  Neurology Normal    Endocrine:  Endocrine Normal    Dermatological:   Cellulitis of left knee    Psych:  Psychiatric Normal           Physical Exam  General:  Morbid Obesity    Airway/Jaw/Neck:  Airway Findings: Mouth Opening: Normal Tongue: Normal  General Airway Assessment: Adult  Mallampati: II        Eyes/Ears/Nose:  EYES/EARS/NOSE FINDINGS: Normal   Dental:  DENTAL FINDINGS: Normal   Chest/Lungs:  Chest/Lungs Findings: Clear to auscultation, Decreased Breathe Sounds Left     Heart/Vascular:  Heart Findings: Rate: Normal  Rhythm: Regular Rhythm        Mental Status:  Mental Status Findings:  Cooperative, Alert and Oriented         Anesthesia Plan  Type of Anesthesia, risks & benefits discussed:  Anesthesia Type:  general  Patient's Preference:   Intra-op Monitoring Plan: standard ASA monitors  Intra-op Monitoring Plan Comments:   Post Op Pain Control Plan: IV/PO Opioids PRN  Post Op Pain Control Plan Comments:   Induction:   IV  Beta Blocker:  Patient is not currently on a Beta-Blocker (No further documentation required).       Informed Consent: Patient understands risks and agrees with Anesthesia plan.  Questions answered. Anesthesia consent signed with patient.  ASA Score: 3     Day of Surgery Review of History & Physical: I have  interviewed and examined the patient. I have reviewed the patient's H&P dated:  There are no significant changes.  H&P update referred to the surgeon.         Ready For Surgery From Anesthesia Perspective.

## 2019-04-04 NOTE — TRANSFER OF CARE
"Anesthesia Transfer of Care Note    Patient: Dion Hong Jr.    Procedure(s) Performed: Procedure(s) (LRB):  INCISION AND DRAINAGE, ABSCESS (Left)    Patient location: PACU    Anesthesia Type: general    Transport from OR: Transported from OR on 2-3 L/min O2 by NC with adequate spontaneous ventilation    Post pain: adequate analgesia    Post assessment: no apparent anesthetic complications and tolerated procedure well    Post vital signs: stable    Level of consciousness: sedated    Nausea/Vomiting: no nausea/vomiting    Complications: none    Transfer of care protocol was followed      Last vitals:   Visit Vitals  BP (!) 146/71 (BP Location: Left arm, Patient Position: Lying)   Pulse 85   Temp 36.8 °C (98.2 °F) (Skin)   Resp 20   Ht 5' 8" (1.727 m)   Wt 124.2 kg (273 lb 12.8 oz)   SpO2 95%   BMI 41.63 kg/m²     "

## 2019-04-04 NOTE — CONSULTS
"[facility]  Orthopedics  Consult Note    Patient Name: Dion Hong Jr.  MRN: [id number]  Admission Date: 4/3/2019  Hospital Length of Stay: 1 days  Attending Provider: Mralena Walker MD  Primary Care Provider: Primary Doctor No    Patient information was obtained from patient and ER records.     Inpatient consult to Orthopedic Surgery  Consult performed by: Tray Carrasco MD  Consult ordered by: Jeri Mathews NP  Reason for consult: left knee infection        Subjective:     Principal Problem:Cellulitis of left lower extremity    Chief Complaint:   Chief Complaint   Patient presents with    Cellulitis     left knee x 1 week         HPI: Dion Hong is a 39-year-old male with PMHx significant for MRSA skin infections.  He presented to the ED with complaints of LLE swelling and redness for the last 3 days.  He states initially he noticed a boil" to his left knee.  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  He noted fluctuance to the area, then eventual redness, swelling, and pain. His pain was worsened with standing on the leg and to touch.  He rated pain at worst 8/10 and is improved with rest.  Associated symptoms include subjective fever and chills, as well as nausea.  He states he is up-to-date on his tetanus.  He does endorse trying to drain the boil himself at home with a brand new 20 gauge needle after cleansing the area with ETOH and witch Hazel.  He reports minimal pus drainage at that time.  He denies any Hx of blood clots or any known Hx of blood clots, though he speculates that his mom and dad may possibly have blood clots.  He denies any chest pain, SOB, abdominal pain, urinary complaints, or numbness/tingling.         History reviewed. No pertinent past medical history.    History reviewed. No pertinent surgical history.    Review of patient's allergies indicates:  No Known " Allergies    Current Facility-Administered Medications   Medication    0.9%  NaCl infusion    acetaminophen tablet 1,000 mg    cefTRIAXone (ROCEPHIN) 1 g in dextrose 5 % 50 mL IVPB    dextrose 50% injection 12.5 g    dextrose 50% injection 25 g    enoxaparin injection 40 mg    glucagon (human recombinant) injection 1 mg    glucose chewable tablet 16 g    glucose chewable tablet 24 g    ibuprofen tablet 400 mg    magnesium oxide tablet 800 mg    magnesium oxide tablet 800 mg    ondansetron injection 8 mg    potassium chloride 10% oral solution 40 mEq    potassium chloride 10% oral solution 60 mEq    senna-docusate 8.6-50 mg per tablet 1 tablet    sodium chloride 0.9% flush 10 mL    vancomycin (VANCOCIN) 1,250 mg in dextrose 5 % 250 mL IVPB     Family History     Problem Relation (Age of Onset)    Diabetes Father    Hyperlipidemia Father    Hypertension Father    Obesity Mother        Tobacco Use    Smoking status: Never Smoker   Substance and Sexual Activity    Alcohol use: No     Frequency: Never    Drug use: No    Sexual activity: Not on file     Review of Systems   Constitution: Negative for chills and fever.   HENT: Negative for congestion.    Eyes: Negative for blurred vision.   Cardiovascular: Negative for chest pain and syncope.   Respiratory: Negative for cough and shortness of breath.    Endocrine: Negative for polyuria.   Hematologic/Lymphatic: Negative for bleeding problem. Does not bruise/bleed easily.   Skin: Negative for rash.   Musculoskeletal: Positive for joint pain and joint swelling. Negative for falls, muscle cramps, muscle weakness and myalgias.   Gastrointestinal: Negative for abdominal pain, nausea and vomiting.   Genitourinary: Negative for flank pain.   Neurological: Negative for numbness and seizures.   Psychiatric/Behavioral: Negative for altered mental status.   Allergic/Immunologic: Negative for persistent infections.     Objective:     Vital Signs (Most  "Recent):  Temp: 98.2 °F (36.8 °C) (04/04/19 1126)  Pulse: 85 (04/04/19 1126)  Resp: 18 (04/04/19 1126)  BP: 128/74 (04/04/19 1126)  SpO2: (!) 94 % (04/04/19 1126) Vital Signs (24h Range):  Temp:  [98.2 °F (36.8 °C)-101.8 °F (38.8 °C)] 98.2 °F (36.8 °C)  Pulse:  [] 85  Resp:  [16-20] 18  SpO2:  [92 %-99 %] 94 %  BP: (128-145)/(60-85) 128/74     Weight: 124.2 kg (273 lb 12.8 oz)  Height: 5' 8" (172.7 cm)  Body mass index is 41.63 kg/m².      Intake/Output Summary (Last 24 hours) at 4/4/2019 1143  Last data filed at 4/4/2019 0600  Gross per 24 hour   Intake 1810 ml   Output 1300 ml   Net 510 ml       General    Nursing note and vitals reviewed.  Constitutional: He is oriented to person, place, and time. He appears well-developed and well-nourished. No distress.   HENT:   Head: Normocephalic and atraumatic.   Eyes: EOM are normal.   Cardiovascular: Normal rate.    Pulmonary/Chest: Effort normal.   Abdominal: Soft.   Neurological: He is alert and oriented to person, place, and time.   Psychiatric: His behavior is normal.           Right Knee Exam   Right knee exam is normal.    Left Knee Exam     Inspection   Erythema: present  Swelling: present    Range of Motion   Extension: 0   Flexion: 120     Other   Sensation: normal    Comments:  A little fluctuant area near prepatellar area    Muscle Strength   Left Lower Extremity   Quadriceps:  4/5     Vascular Exam       Left Pulses  Dorsalis Pedis:      2+          Edema  Left Lower Leg: present      Significant Labs:   CBC:   Recent Labs   Lab 04/03/19  1900 04/04/19  0534   WBC 9.50 7.60   HGB 14.2 13.0*   HCT 43.0 39.4*    230     CMP:   Recent Labs   Lab 04/03/19  1900 04/04/19  0534    137   K 3.6 3.7    103   CO2 25 26   GLU 69* 119*   BUN 13 11   CREATININE 0.8 0.8   CALCIUM 9.8 8.9   PROT  --  6.8   ALBUMIN  --  3.3*   BILITOT  --  0.5   ALKPHOS  --  50*   AST  --  22   ALT  --  29   ANIONGAP 11 8   EGFRNONAA >60 >60     All pertinent labs " within the past 24 hours have been reviewed.    Significant Imaging: X-Ray: I have reviewed all pertinent results/findings and my personal findings are:  R knee shows anterior swelling    Assessment/Plan:     * Cellulitis of left lower extremity  To OR for I&D, cultures.         Thank you for your consult. I will follow-up with patient. Please contact us if you have any additional questions.    Tray Carrasco MD  Orthopedics  [facility]

## 2019-04-04 NOTE — PROGRESS NOTES
Verified with Dr. Carrasco that no additional IV abx needed preop, patient is on scheduled antibiotics.  He didn't want anything new ordered or given prior to culture.  Report given to Tammy Patton RN and Dion Chan CRNA informing of above and that the Vancomycin at the bedside is due at 1630.  TONY Chan is aware that no abx to be administered prior to culture.

## 2019-04-04 NOTE — PROGRESS NOTES
Received report from Valerie Cortez LPN.  Spoke with Shree Hein, hospital transporter about bringing patient to [facility] at 1445.

## 2019-04-04 NOTE — ED NOTES
Pt in room 6 for evaluation of pain, redness and swelling to left knee.  Pt is awake, alert and oriented. Resp even and unlabored. Abd large and non-tender. Pt denies chest pain or sob.  Dr. Cuello at bedside for exam.

## 2019-04-04 NOTE — ASSESSMENT & PLAN NOTE
This patient does have evidence of infective focus- Skin, possible joint  My overall impression is sepsis.  Antibiotics given-   Antibiotics (From admission, onward)    Start     Stop Route Frequency Ordered    04/04/19 0100  vancomycin (VANCOCIN) 1,250 mg in dextrose 5 % 250 mL IVPB      -- IV Every 8 hours (non-standard times) 04/03/19 2055 04/03/19 2245  cefTRIAXone (ROCEPHIN) 1 g in dextrose 5 % 50 mL IVPB      -- IV Every 24 hours (non-standard times) 04/03/19 2139    04/03/19 1900  vancomycin in dextrose 5 % 1 gram/250 mL IVPB 1,000 mg      04/04 0659 IV ED 1 Time 04/03/19 1845          Severe Sepsis only-  Latest labs reviewed, they are-  No results for input(s): CBC, BILITOT in the last 24 hours.    Invalid input(s): CREATININE.1  Recent Labs   Lab 04/03/19  1948   LACTATE 1.4     No results for input(s): PH, PO2, PCO2, HCO3, BE in the last 168 hours.    No evidence of organ dysfunction   Follow up lactate needed- in 4 hours    No evidence of septic shock. Follow blood and possible wound cultures.

## 2019-04-04 NOTE — OP NOTE
[facility]  Orthopedic Surgery  Operative Note    SUMMARY     Date of Procedure: 4/4/2019     Procedure: Procedure(s) (LRB):  INCISION AND DRAINAGE, ABSCESS (Left)       Surgeon(s) and Role:     * Tray Carrasco MD - Primary    1st Assistant: None    Pre-Operative Diagnosis: Knee pain [M25.569]    Post-Operative Diagnosis: Post-Op Diagnosis Codes:     * Knee pain [M25.569]    Anesthesia: General      Description of the Findings of the Procedure: small about 2ml pocket of purulence in prepatellar area    Complications: No    Estimated Blood Loss (EBL): 3ml    Tourniquet time: none           Implants: * No implants in log *    Specimens:   Specimen (12h ago, onward)    None                  Condition: Good    Disposition: PACU - hemodynamically stable.    Attestation: I was present and scrubbed for the entire procedure.    Indications: A 39-year-old male with a history of infection  including previous cellulitis to left knee with small abscess.The patient was admitted for some IV antibiotics.     PROCEDURE IN DETAIL: Risks, benefits and alternatives of the procedure were   explained to the patient including, but not limited to damage to nerves,   arteries or blood vessels, also explained risk of continued infection, possible   need for future operations as well as anesthetic complications including   seizure, stroke, heart attack and death, understood this and signed informed   consent. The patient's left knee was marked prior to coming to the Operating   Room. Once a formal time out was done, in which correct patient, procedure and   op site were all correctly identified and confirmed by the entire operating   team.  LMA was induced. The patient's left lower extremity was prepped and   draped in normal sterile fashion. A small anterior incision over the swollen   Purulent area was then made as taken down through skin and soft tissue. Cultures were taken of the purulence.  Blunt dissection  using hemostat was performed into the prepatellar bursa and no more infected tissue was seen. we then proceeded with the irrigation portion. Wound was copiously   irrigated with 1 liters of sterile saline. We then proceeded with closing. The  wound was then closed using 3-0 nylon in simple fashion. Sterile   dressing was applied including Adaptic, 4 x 4s, and an Ace wrap. The   patient was then extubated, awakened, and transferred from the Operating Room to  the Recovery Room in stable condition.

## 2019-04-04 NOTE — HPI
"Dion Hong is a 39-year-old male with PMHx significant for MRSA skin infections.  He presented to the ED with complaints of LLE swelling and redness for the last 3 days.  He states initially he noticed a boil" to his left knee.  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  He noted fluctuance to the area, then eventual redness, swelling, and pain. His pain was worsened with standing on the leg and to touch.  He rated pain at worst 8/10 and is improved with rest.  Associated symptoms include subjective fever and chills, as well as nausea.  He states he is up-to-date on his tetanus.  He does endorse trying to drain the boil himself at home with a brand new 20 gauge needle after cleansing the area with ETOH and witch Hazel.  He reports minimal pus drainage at that time.  He denies any Hx of blood clots or any known Hx of blood clots, though he speculates that his mom and dad may possibly have blood clots.  He denies any chest pain, SOB, abdominal pain, urinary complaints, or numbness/tingling.  Other pertinent medical Hx as below:  "

## 2019-04-04 NOTE — H&P
"[facility] Medicine  History & Physical    Patient Name: Dion Hong Jr.  MRN: [id number]  Admission Date: 4/3/2019  Attending Physician: Marlena Walker MD   Primary Care Provider: Primary Doctor No         Patient information was obtained from patient, past medical records and ER records.     Subjective:     Principal Problem:Cellulitis of left lower extremity    Chief Complaint:   Chief Complaint   Patient presents with    Cellulitis     left knee x 1 week         HPI: Dion Hong is a 39-year-old male with PMHx significant for MRSA skin infections.  He presented to the ED with complaints of LLE swelling and redness for the last 3 days.  He states initially he noticed a boil" to his left knee.  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  He noted fluctuance to the area, then eventual redness, swelling, and pain. His pain was worsened with standing on the leg and to touch.  He rated pain at worst 8/10 and is improved with rest.  Associated symptoms include subjective fever and chills, as well as nausea.  He states he is up-to-date on his tetanus.  He does endorse trying to drain the boil himself at home with a brand new 20 gauge needle after cleansing the area with ETOH and witch Hazel.  He reports minimal pus drainage at that time.  He denies any Hx of blood clots or any known Hx of blood clots, though he speculates that his mom and dad may possibly have blood clots.  He denies any chest pain, SOB, abdominal pain, urinary complaints, or numbness/tingling.  Other pertinent medical Hx as below:    History reviewed. No pertinent past medical history.    History reviewed. No pertinent surgical history.    Review of patient's allergies indicates:  No Known Allergies    No current facility-administered medications on file prior to encounter.      No current outpatient medications on file prior to " encounter.     Family History     Problem Relation (Age of Onset)    Diabetes Father    Hyperlipidemia Father    Hypertension Father    Obesity Mother        Tobacco Use    Smoking status: Never Smoker   Substance and Sexual Activity    Alcohol use: No     Frequency: Never    Drug use: No    Sexual activity: Not on file     Review of Systems   Constitutional: Positive for activity change, chills and fever.   HENT: Positive for postnasal drip and sore throat. Negative for congestion, sinus pressure and trouble swallowing.    Eyes: Negative for photophobia and visual disturbance.   Respiratory: Negative for cough, shortness of breath and wheezing.    Cardiovascular: Positive for leg swelling. Negative for chest pain and palpitations.   Gastrointestinal: Positive for nausea. Negative for abdominal distention, abdominal pain, constipation, diarrhea and vomiting.   Genitourinary: Negative for difficulty urinating, dysuria, flank pain, frequency and hematuria.   Musculoskeletal: Positive for arthralgias, gait problem, joint swelling and myalgias.   Skin: Positive for color change and wound.   Neurological: Positive for headaches (mild, not abnormal.). Negative for dizziness, weakness and numbness.   Psychiatric/Behavioral: Negative for confusion. The patient is not nervous/anxious.      Objective:     Vital Signs (Most Recent):  Temp: 99.8 °F (37.7 °C) (04/03/19 2048)  Pulse: (!) 114 (04/03/19 2048)  Resp: 18 (04/03/19 2048)  BP: 128/75 (04/03/19 2048)  SpO2: 98 % (04/03/19 2051) Vital Signs (24h Range):  Temp:  [99.8 °F (37.7 °C)-101.8 °F (38.8 °C)] 99.8 °F (37.7 °C)  Pulse:  [114-127] 114  Resp:  [18-20] 18  SpO2:  [96 %-99 %] 98 %  BP: (128-145)/(62-85) 128/75     Weight: 117.9 kg (260 lb)  Body mass index is 39.53 kg/m².    Physical Exam   Constitutional: He is oriented to person, place, and time. He appears well-developed and well-nourished. No distress.   HENT:   Head: Normocephalic and atraumatic.   Eyes:  Pupils are equal, round, and reactive to light. Conjunctivae and EOM are normal.   Neck: Normal range of motion. Neck supple. No thyromegaly present.   Cardiovascular: Regular rhythm, normal heart sounds and intact distal pulses. Tachycardia present.   No murmur heard.  Pulmonary/Chest: Effort normal and breath sounds normal. No respiratory distress.   Diminished breath sounds bilaterally.   Abdominal: Soft. Bowel sounds are normal. He exhibits no distension. There is no tenderness.   obese   Musculoskeletal: Normal range of motion. He exhibits edema and tenderness.   Neurological: He is alert and oriented to person, place, and time. No cranial nerve deficit.   Skin: Skin is warm and dry. Capillary refill takes less than 2 seconds. There is erythema.   LEFT knee and lower aspect of LLE erythematous and taut.  Central area on patella with ~ 3 mm circular area of firmness with healed central lesion.    Psychiatric: He has a normal mood and affect. His behavior is normal. Judgment and thought content normal.         CRANIAL NERVES     CN III, IV, VI   Pupils are equal, round, and reactive to light.  Extraocular motions are normal.        Significant Labs:   CBC:   Recent Labs   Lab 04/03/19  1900   WBC 9.50   HGB 14.2   HCT 43.0        CMP:   Recent Labs   Lab 04/03/19  1900      K 3.6      CO2 25   GLU 69*   BUN 13   CREATININE 0.8   CALCIUM 9.8   ANIONGAP 11   EGFRNONAA >60     Lactic Acid:   Recent Labs   Lab 04/03/19  1948   LACTATE 1.4     Urine Studies:   Recent Labs   Lab 04/03/19 2035   COLORU Yellow   APPEARANCEUA Clear   PHUR 6.0   SPECGRAV 1.020   PROTEINUA Negative   GLUCUA Negative   KETONESU Negative   BILIRUBINUA Negative   OCCULTUA 1+*   NITRITE Negative   UROBILINOGEN Negative   LEUKOCYTESUR Negative   RBCUA 5*   WBCUA 0   BACTERIA None   SQUAMEPITHEL 1       XR Knee: Diffuse soft tissue swelling involving the left knee.  No evidence of radiopaque foreign body.  The findings may be  seen with cellulitis.    Assessment/Plan:     * Cellulitis of left lower extremity  Continue IV vanc with pharmacy to dose.  Pain control and antipyretics as needed.  Follow clinically and adjust Abx as necessary.  Consult with Orthopedics given location of the possible abscess at the patellar region which may need I&D.  NPO after midnight in case surgical intervention is required.  Will add ceftriaxone in case there is joint involvement.      Sepsis  This patient does have evidence of infective focus- Skin, possible joint  My overall impression is sepsis.  Antibiotics given-   Antibiotics (From admission, onward)    Start     Stop Route Frequency Ordered    04/04/19 0100  vancomycin (VANCOCIN) 1,250 mg in dextrose 5 % 250 mL IVPB      -- IV Every 8 hours (non-standard times) 04/03/19 2055 04/03/19 2245  cefTRIAXone (ROCEPHIN) 1 g in dextrose 5 % 50 mL IVPB      -- IV Every 24 hours (non-standard times) 04/03/19 2139    04/03/19 1900  vancomycin in dextrose 5 % 1 gram/250 mL IVPB 1,000 mg      04/04 0659 IV ED 1 Time 04/03/19 1845          Severe Sepsis only-  Latest labs reviewed, they are-  No results for input(s): CBC, BILITOT in the last 24 hours.    Invalid input(s): CREATININE.1  Recent Labs   Lab 04/03/19  1948   LACTATE 1.4     No results for input(s): PH, PO2, PCO2, HCO3, BE in the last 168 hours.    No evidence of organ dysfunction   Follow up lactate needed- in 4 hours    No evidence of septic shock. Follow blood and possible wound cultures.     Morbid obesity with BMI of 40.0-44.9, adult  Body mass index is 41.63 kg/m².  Obesity compounds patient co-morbidities and complicates treatment course.  Counseling given regarding diet modification and exercise recommendations.          VTE Risk Mitigation (From admission, onward)        Ordered     enoxaparin injection 40 mg  Daily      04/03/19 2046     IP VTE HIGH RISK PATIENT  Once      04/03/19 2046             Jeri Mathews NP  Department of Hospital  Medicine   [facility]

## 2019-04-04 NOTE — CONSULTS
iDon Hong Jr. [id number] is a 39 y.o. male who has been consulted for vancomycin dosing.    The patient has the following labs:     Date Creatinine (mg/dl)    BUN WBC Count   4/3/2019 Estimated Creatinine Clearance: 153.1 mL/min (based on SCr of 0.8 mg/dL). Lab Results   Component Value Date    BUN 13 04/03/2019     Lab Results   Component Value Date    WBC 9.50 04/03/2019        Current weight is 117.9 kg (260 lb)    The patient received  1000 mg on 04/03 at 1919 (if pt has already received first dose including doses in ED)    The patient will be started on vancomycin at a dose of 1250 mg every 8 hours (10 mg/kg/dose).  The vancomycin trough has been ordered for 04/04 at 1630.  Target trough goal is 10 to 15 mg/dL for cellulitis.    Patient will be followed by pharmacy for changes in renal function, toxicity, and efficacy.   Thank you for allowing us to participate in this patient's care.     Evan Castañeda, PharmD

## 2019-04-04 NOTE — PROGRESS NOTES
04/04/19 1030   Patient Assessment/Suction   Level of Consciousness (AVPU) alert   Respiratory Effort Normal;Unlabored   PRE-TX-O2   O2 Device (Oxygen Therapy) room air   SpO2 97 %   Pulse Oximetry Type Intermittent   $ Pulse Oximetry - Multiple Charge Pulse Oximetry - Multiple   Pulse 109   Resp 18

## 2019-04-04 NOTE — SUBJECTIVE & OBJECTIVE
History reviewed. No pertinent past medical history.    History reviewed. No pertinent surgical history.    Review of patient's allergies indicates:  No Known Allergies    Current Facility-Administered Medications   Medication    0.9%  NaCl infusion    acetaminophen tablet 1,000 mg    cefTRIAXone (ROCEPHIN) 1 g in dextrose 5 % 50 mL IVPB    dextrose 50% injection 12.5 g    dextrose 50% injection 25 g    enoxaparin injection 40 mg    glucagon (human recombinant) injection 1 mg    glucose chewable tablet 16 g    glucose chewable tablet 24 g    ibuprofen tablet 400 mg    magnesium oxide tablet 800 mg    magnesium oxide tablet 800 mg    ondansetron injection 8 mg    potassium chloride 10% oral solution 40 mEq    potassium chloride 10% oral solution 60 mEq    senna-docusate 8.6-50 mg per tablet 1 tablet    sodium chloride 0.9% flush 10 mL    vancomycin (VANCOCIN) 1,250 mg in dextrose 5 % 250 mL IVPB     Family History     Problem Relation (Age of Onset)    Diabetes Father    Hyperlipidemia Father    Hypertension Father    Obesity Mother        Tobacco Use    Smoking status: Never Smoker   Substance and Sexual Activity    Alcohol use: No     Frequency: Never    Drug use: No    Sexual activity: Not on file     Review of Systems   Constitution: Negative for chills and fever.   HENT: Negative for congestion.    Eyes: Negative for blurred vision.   Cardiovascular: Negative for chest pain and syncope.   Respiratory: Negative for cough and shortness of breath.    Endocrine: Negative for polyuria.   Hematologic/Lymphatic: Negative for bleeding problem. Does not bruise/bleed easily.   Skin: Negative for rash.   Musculoskeletal: Positive for joint pain and joint swelling. Negative for falls, muscle cramps, muscle weakness and myalgias.   Gastrointestinal: Negative for abdominal pain, nausea and vomiting.   Genitourinary: Negative for flank pain.   Neurological: Negative for numbness and seizures.  "  Psychiatric/Behavioral: Negative for altered mental status.   Allergic/Immunologic: Negative for persistent infections.     Objective:     Vital Signs (Most Recent):  Temp: 98.2 °F (36.8 °C) (04/04/19 1126)  Pulse: 85 (04/04/19 1126)  Resp: 18 (04/04/19 1126)  BP: 128/74 (04/04/19 1126)  SpO2: (!) 94 % (04/04/19 1126) Vital Signs (24h Range):  Temp:  [98.2 °F (36.8 °C)-101.8 °F (38.8 °C)] 98.2 °F (36.8 °C)  Pulse:  [] 85  Resp:  [16-20] 18  SpO2:  [92 %-99 %] 94 %  BP: (128-145)/(60-85) 128/74     Weight: 124.2 kg (273 lb 12.8 oz)  Height: 5' 8" (172.7 cm)  Body mass index is 41.63 kg/m².      Intake/Output Summary (Last 24 hours) at 4/4/2019 1143  Last data filed at 4/4/2019 0600  Gross per 24 hour   Intake 1810 ml   Output 1300 ml   Net 510 ml       General    Nursing note and vitals reviewed.  Constitutional: He is oriented to person, place, and time. He appears well-developed and well-nourished. No distress.   HENT:   Head: Normocephalic and atraumatic.   Eyes: EOM are normal.   Cardiovascular: Normal rate.    Pulmonary/Chest: Effort normal.   Abdominal: Soft.   Neurological: He is alert and oriented to person, place, and time.   Psychiatric: His behavior is normal.           Right Knee Exam   Right knee exam is normal.    Left Knee Exam     Inspection   Erythema: present  Swelling: present    Range of Motion   Extension: 0   Flexion: 120     Other   Sensation: normal    Comments:  A little fluctuant area near prepatellar area    Muscle Strength   Left Lower Extremity   Quadriceps:  4/5     Vascular Exam       Left Pulses  Dorsalis Pedis:      2+          Edema  Left Lower Leg: present      Significant Labs:   CBC:   Recent Labs   Lab 04/03/19  1900 04/04/19  0534   WBC 9.50 7.60   HGB 14.2 13.0*   HCT 43.0 39.4*    230     CMP:   Recent Labs   Lab 04/03/19  1900 04/04/19  0534    137   K 3.6 3.7    103   CO2 25 26   GLU 69* 119*   BUN 13 11   CREATININE 0.8 0.8   CALCIUM 9.8 8.9 "   PROT  --  6.8   ALBUMIN  --  3.3*   BILITOT  --  0.5   ALKPHOS  --  50*   AST  --  22   ALT  --  29   ANIONGAP 11 8   EGFRNONAA >60 >60     All pertinent labs within the past 24 hours have been reviewed.    Significant Imaging: X-Ray: I have reviewed all pertinent results/findings and my personal findings are:  R knee shows anterior swelling

## 2019-04-04 NOTE — ASSESSMENT & PLAN NOTE
Continue IV vanc with pharmacy to dose.  Pain control and antipyretics as needed.  Follow clinically and adjust Abx as necessary.  Consult with Orthopedics given location of the possible abscess at the patellar region which may need I&D.  NPO after midnight in case surgical intervention is required.  Will add ceftriaxone in case there is joint involvement.

## 2019-04-04 NOTE — PLAN OF CARE
Cm completed the assessment with pt and spouse at bedside.  Pt arrived form home, he is independent in care.  Pt does not have a PCP-Cm will schedule a f/u with [facility]  Pt has no insurance.  Disposition:  Pt will discharge to home with family.   Pt need PCP.  No other needs verbalized at this time.       04/04/19 1440   Discharge Assessment   Assessment Type Discharge Planning Assessment   Confirmed/corrected address and phone number on facesheet? Yes   Assessment information obtained from? Patient   Communicated expected length of stay with patient/caregiver yes   Prior to hospitilization cognitive status: Alert/Oriented   Prior to hospitalization functional status: Independent   Current cognitive status: Alert/Oriented   Current Functional Status: Independent   Facility Arrived From: home   Lives With spouse   Able to Return to Prior Arrangements yes   Is patient able to care for self after discharge? Yes   Who are your caregiver(s) and their phone number(s)? tejal Mac [phone]   Patient's perception of discharge disposition home or selfcare   Readmission Within the Last 30 Days no previous admission in last 30 days   Patient currently being followed by outpatient case management? No   Patient currently receives any other outside agency services? No   Equipment Currently Used at Home none   Do you have any problems affording any of your prescribed medications? Yes   If yes, what medications? Pt without insurance.  Pt needs generis medicaitons   Is the patient taking medications as prescribed? yes   Does the patient have transportation home? Yes   Transportation Anticipated car, drives self;family or friend will provide   Dialysis Name and Scheduled days na   Does the patient receive services at the [facility]mad[facility]? No   Discharge Plan A Home with family   DME Needed Upon Discharge  none   Patient/Family in Agreement with Plan yes

## 2019-04-04 NOTE — PROGRESS NOTES
Called lab and informed them that a Vanc trough needs to be drawn on pt. Lab stated they will send someone to draw the lab.

## 2019-04-04 NOTE — PLAN OF CARE
04/04/19 1607   Final Note   Assessment Type Final Discharge Note   Anticipated Discharge Disposition Home   Hospital Follow Up  Appt(s) scheduled? Yes

## 2019-04-04 NOTE — PLAN OF CARE
Problem: Adult Inpatient Plan of Care  Goal: Plan of Care Review  PT eval completed. Gait 250ft independently- no acute PT needs

## 2019-04-04 NOTE — PT/OT/SLP EVAL
"Occupational Therapy   Evaluation and Discharge Note    Name: Dion Hong Jr.  MRN: [id number]  Admitting Diagnosis:  Cellulitis of left lower extremity      Recommendations:     Discharge Recommendations: home  Discharge Equipment Recommendations:  none  Barriers to discharge:  None    Assessment:     Dion Hong Jr. is a 39 y.o. male with a medical diagnosis of Cellulitis of left lower extremity. At this time, patient is functioning at their prior level of function and does not require further acute OT services.     Plan:     During this hospitalization, patient does not require further acute OT services.  Please re-consult if situation changes.    · Plan of Care Reviewed with: patient    Subjective     Chief Complaint: L knee pain  Patient/Family Comments/goals: "I'm trying to get out of here."    Occupational Profile:  Living Environment: Pt lives with wife and 3 kids in [facility] with 4 ELLIOT with R HR.  Previous level of function: Pt was independent with ADL and mobility.   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  Equipment Used at home:  none  Assistance upon Discharge: Pt will receive assistance from family if needed.    Pain/Comfort:  · Pain Rating 1: 5/10  · Location - Side 1: Left  · Location 1: knee  · Pain Addressed 1: Reposition, Distraction  · Pain Rating Post-Intervention 1: 5/10    Patients cultural, spiritual, Quaker conflicts given the current situation:      Objective:     Communicated with: nurse Rivers prior to session.  Patient found HOB elevated with peripheral IV, telemetry upon OT entry to room.    General Precautions: Standard, other (see comments)(Standard)   Orthopedic Precautions:LLE weight bearing as tolerated   Braces: N/A     Occupational Performance:    Bed Mobility:    · Patient completed Scooting/Bridging with independence  · Patient completed Supine to Sit with independence  · Patient completed Sit to Supine with independence    Functional Mobility/Transfers:  · Patient " completed Sit <> Stand Transfer with independence  with  no assistive device   · Patient completed Toilet Transfer Stand Pivot technique with independence with  no AD  · Functional Mobility: Patient ambulated in room with independence and no AD from bed>sink>bathroom>bed again. No LOB.    Activities of Daily Living:  · Grooming: independence with oral and facial hygiene while standing at sink.  · Lower Body Dressing: independence to don underwear, shorts, and socks while seated EOB.     Cognitive/Visual Perceptual:  Cognitive/Psychosocial Skills:     -       Oriented to: x4   -       Follows Commands/attention:Follows multistep  commands  -       Communication: clear/fluent  -       Safety awareness/insight to disability: intact   -       Mood/Affect/Coping skills/emotional control: Appropriate to situation  Visual/Perceptual:      -Intact     Physical Exam:  Postural examination/scapula alignment:    -       Rounded shoulders  -       Forward head  Upper Extremity Range of Motion:     -       Right Upper Extremity: WNL  -       Left Upper Extremity: WNL  Upper Extremity Strength:    -       Right Upper Extremity: WNL  -       Left Upper Extremity: WNL   Strength:    -       Right Upper Extremity: WNL  -       Left Upper Extremity: WNL  Fine Motor Coordination:    -       Intact  Gross motor coordination:   WFL    AMPAC 6 Click ADL:  AMPAC Total Score: 24    Education:    Patient left HOB elevated with all lines intact and call button in reach    GOALS:   Multidisciplinary Problems     Occupational Therapy Goals     Not on file          Multidisciplinary Problems (Resolved)        Problem: Occupational Therapy Goal    Goal Priority Disciplines Outcome Interventions   Occupational Therapy Goal   (Resolved)     OT, PT/OT Outcome(s) achieved                    History:     History reviewed. No pertinent past medical history.    History reviewed. No pertinent surgical history.    Time Tracking:     OT Date of  Treatment: 04/04/19  OT Start Time: 0953  OT Stop Time: 1006  OT Total Time (min): 13 min    Billable Minutes:Evaluation 13    Kana Perez OT  4/4/2019

## 2019-04-04 NOTE — PT/OT/SLP EVAL
Physical Therapy Evaluation and Discharge Note    Patient Name:  Dion Hong Jr.   MRN:  [id number]    Recommendations:     Discharge Recommendations:  home   Discharge Equipment Recommendations: none   Barriers to discharge: None    Assessment:     Dion Hong Jr. is a 39 y.o. male admitted with a medical diagnosis of Cellulitis of left lower extremity. .  At this time, patient is functioning at their prior level of function and does not require further acute PT services.     Recent Surgery: * No surgery found *      Plan:     During this hospitalization, patient does not require further acute PT services.  Please re-consult if situation changes.      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  Equipment used at home: none.  DME owned (not currently used): none.  Upon discharge, patient will have assistance from family.    Objective:     Communicated with nurse Rivers prior to session.  Patient found HOB elevated with   upon PT entry to room.    General Precautions: Standard,     Orthopedic Precautions:LLE weight bearing as tolerated   Braces: N/A     Exams:  · RLE ROM: WFL  · RLE Strength: WFL  · LLE ROM: WFL  · LLE Strength: WFL    Functional Mobility:  · Bed Mobility:     · Supine to Sit: independence  · Sit to Supine: independence  · Transfers:     · Sit to Stand:  independence with no AD  · Gait: 250ft independently    AM-PAC 6 CLICK MOBILITY  Total Score:24       Therapeutic  Activities and Exercises:   back to bed post PT    AM-PAC 6 CLICK MOBILITY  Total Score:24     Patient left HOB elevated with all lines intact, call button in reach and nurse Sandra notified.    GOALS:   Multidisciplinary Problems     Physical Therapy Goals     Not on file                History:     History reviewed. No pertinent past medical history.    History reviewed. No pertinent surgical history.    Time Tracking:     PT Received On: 04/04/19  PT Start Time: 1011     PT Stop Time: 1022  PT Total Time (min): 11 min     Billable Minutes: Evaluation 11      Bee Garibay, PT  04/04/2019

## 2019-04-04 NOTE — PLAN OF CARE
"Problem: Adult Inpatient Plan of Care  Goal: Plan of Care Review  Outcome: Ongoing (interventions implemented as appropriate)  Pt states "understanding," to d/c instructions,  new medication administration, telemetry removed, pt tolerated well, pt is schedule to have I&D by dr martin at 1600 prior to d/c, left leg has +3 edema palpated with + alexandrea pedal pulses, LLE warm to touch and red in color, pain is control with current regimen, safety maintain    1440-pt transferred to procedure area, ticket to ride printed    1800-pt arrived to unit from surgery, left knee dress in acewrap, remains dry/intact, pt denies joint pain/discomfort at this time, family at bedside, pt is ok to be d/c per dr martin, renotified dr delgado on previous d/c status, states "yes pt is free to be d/c"    1820-  pt packed personal belongings per self, escorted to main lobby by staff, to home per private vehicle, safety maintain    "

## 2019-04-04 NOTE — PLAN OF CARE
Problem: Occupational Therapy Goal  Goal: Occupational Therapy Goal  Outcome: Outcome(s) achieved Date Met: 04/04/19  Patient is independent with ADLs. No acute OT needs. Discharging from OT services.     Kana Perez OT  4/4/2019

## 2019-04-04 NOTE — ASSESSMENT & PLAN NOTE
Body mass index is 41.63 kg/m².  Obesity compounds patient co-morbidities and complicates treatment course.  Counseling given regarding diet modification and exercise recommendations.

## 2019-04-04 NOTE — ANESTHESIA POSTPROCEDURE EVALUATION
Anesthesia Post Evaluation    Patient: Dion Hong Jr.    Procedure(s) Performed: Procedure(s) (LRB):  INCISION AND DRAINAGE, ABSCESS (Left)    Final Anesthesia Type: general  Patient location during evaluation: PACU  Patient participation: Yes- Able to Participate  Level of consciousness: awake and alert  Post-procedure vital signs: reviewed and stable  Pain management: adequate  Airway patency: patent  PONV status at discharge: No PONV  Anesthetic complications: no      Cardiovascular status: hemodynamically stable  Respiratory status: unassisted and room air  Hydration status: euvolemic  Follow-up not needed.          Vitals Value Taken Time   /112 4/4/2019  5:18 PM   Temp 36.9 °C (98.4 °F) 4/4/2019  4:50 PM   Pulse 92 4/4/2019  5:18 PM   Resp 9 4/4/2019  5:18 PM   SpO2 97 % 4/4/2019  5:18 PM   Vitals shown include unvalidated device data.      No case tracking events are documented in the log.      Pain/Elo Score: Pain Rating Prior to Med Admin: 5 (4/4/2019  8:37 AM)  Pain Rating Post Med Admin: 0 (4/4/2019  9:37 AM)  Elo Score: 4 (4/4/2019  5:00 PM)

## 2019-04-04 NOTE — DISCHARGE SUMMARY
"[facility] Hospital Medicine  Discharge Summary      Patient Name: Dion Hong Jr.  MRN: [id number]  Admission Date: 4/3/2019  Hospital Length of Stay: 1 days  Discharge Date and Time:  04/04/2019 1:24 PM  Attending Physician: Marlena Walker MD   Discharging Provider: Marlena Walker MD  Primary Care Provider: Primary Doctor No      HPI:   Dion Hong is a 39-year-old male with PMHx significant for MRSA skin infections.  He presented to the ED with complaints of LLE swelling and redness for the last 3 days.  He states initially he noticed a boil" to his left knee.  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  He noted fluctuance to the area, then eventual redness, swelling, and pain. His pain was worsened with standing on the leg and to touch.  He rated pain at worst 8/10 and is improved with rest.  Associated symptoms include subjective fever and chills, as well as nausea.  He states he is up-to-date on his tetanus.  He does endorse trying to drain the boil himself at home with a brand new 20 gauge needle after cleansing the area with ETOH and witch Hazel.  He reports minimal pus drainage at that time.  He denies any Hx of blood clots or any known Hx of blood clots, though he speculates that his mom and dad may possibly have blood clots.  He denies any chest pain, SOB, abdominal pain, urinary complaints, or numbness/tingling.  Other pertinent medical Hx as below:    Procedure(s) (LRB):  INCISION AND DRAINAGE, ABSCESS (Left)      Hospital Course:   The patient is a 39-year-old gentleman who was admitted to the hospital for treatment of left lower extremity cellulitis.  He was treated with IV vancomycin and his leg looks a lot better today.  The patient was evaluated by Ortho and they do not think he has a knee infection.  The patient will be discharged home on oral antibiotics.  He will follow up with his " PCP in 1 week.     Consults:   Consults (From admission, onward)        Status Ordering Provider     Inpatient consult to Orthopedic Surgery  Once     Provider:  Tray Carrasco MD    Completed SALLIE STERLING     Pharmacy to dose Vancomycin consult  Once     Provider:  (Not yet assigned)    Acknowledged SALLIE STERLING            Final Active Diagnoses:    Diagnosis Date Noted POA    PRINCIPAL PROBLEM:  Cellulitis of left lower extremity [L03.116] 04/03/2019 Yes    Sepsis [A41.9] 04/03/2019 Yes    Morbid obesity with BMI of 40.0-44.9, adult [E66.01, Z68.41] 04/03/2019 Not Applicable      Problems Resolved During this Admission:       Discharged Condition: stable    Disposition: Home or Self Care    Follow Up:  Follow-up Information     Primary Doctor No In 1 week.               Patient Instructions:      Diet Adult Regular     Activity as tolerated       Significant Diagnostic Studies: Labs:   CMP   Recent Labs   Lab 04/03/19 1900 04/04/19  0534    137   K 3.6 3.7    103   CO2 25 26   GLU 69* 119*   BUN 13 11   CREATININE 0.8 0.8   CALCIUM 9.8 8.9   PROT  --  6.8   ALBUMIN  --  3.3*   BILITOT  --  0.5   ALKPHOS  --  50*   AST  --  22   ALT  --  29   ANIONGAP 11 8   ESTGFRAFRICA >60 >60   EGFRNONAA >60 >60    and CBC   Recent Labs   Lab 04/03/19 1900 04/04/19  0534   WBC 9.50 7.60   HGB 14.2 13.0*   HCT 43.0 39.4*    230       Pending Diagnostic Studies:     None         Medications:  Reconciled Home Medications:      Medication List      START taking these medications    clindamycin 300 MG capsule  Commonly known as:  CLEOCIN  Take 1 capsule (300 mg total) by mouth every 8 (eight) hours. for 7 days     ibuprofen 400 MG tablet  Commonly known as:  ADVIL,MOTRIN  Take 1 tablet (400 mg total) by mouth every 6 (six) hours as needed.            Indwelling Lines/Drains at time of discharge:   Lines/Drains/Airways          None          Time spent on the discharge of patient: 30  minutes  Patient was seen and examined on the date of discharge and determined to be suitable for discharge.         Marlena Walker MD  Department of Hospital Medicine  [facility]

## 2019-04-04 NOTE — PLAN OF CARE
Pt AAO x 4, vss, pain tolerable, pt received a pain pill, dressing to L knee cdi, tolerated clear liquids without N/V, SCD to R leg, Vanc infusing. Ok per Dr. Carbajal to transfer the pt back to the floor. Pt transported via bed back to room 307 bed 1. Pt's wife and daughter at the bedside. Call light within reach. Report given to CONRADO Coronado.    Spoke with Dr. Carrasco concerning pt being able to go home today. Dr. Carrasco stated if it is ok with the hospitalist and if the pt is sent home with a prescription for antibiotics then he can go home. Updated pt's nurse CONRADO Coronado on this and she verbalized understanding and stated that she will speak to the hospitalist.

## 2019-04-08 LAB — BACTERIA SPEC AEROBE CULT: NORMAL

## 2019-04-08 NOTE — PHYSICIAN QUERY
"PT Name: Dion Hong Jr.  MR #: [id number]    Physician Query Form -Systemic Infectious Process Clarification     CDS/: Elinor Cornelius RN              Contact information:[email or web address]  This form is a permanent document in the medical record.     Query Date: April 8, 2019     By submitting this query, we are merely seeking further clarification of documentation. Please utilize your independent clinical judgment when addressing the question(s) below.    The Medical record contains the following:     Indicators   Supporting Clinical Findings   Location in Medical Record   x HR RR BP Temp Temp 101.1    /77  Temp 101.8    ./85  Temp 101.2    /75  Temp 98.8  HR 96  /96 Vital signs 4/3@1752  Vital signs 4/3 9618-5022  Vital signs 4/[address]  Vital signs 4/4@1751   x Lactic Acid             Procalcitonin Lactate 1.4 Labs 4/3   x WBC                Bands                     CRP CRP 90.5  WBC 9.5 Labs 4/3   x Culture(s) STAPHYLOCOCCUS AUREUS  Blood culture NGTD Aerobic wound culture 4/4  Blood culture 4/3    AMS, Confusion, LOC, etc.      Organ Dysfunction / Failure     x Bacteremia or Sepsis / Septic Sepsis  Date noted 4/3  POA: yes    Severe Sepsis    Discharge summary    H&P   x Known or Suspected Source of Infection documented This patient does have evidence of infective focus- Skin, possible joint   H&P    (Failed) Outpatient Treatment     x Medication Vancomycin IVPB started 4/3  Ceftriaxone IVPB started 4/3 H&P    Treatment     x Other PMHx significant for MRSA skin infections.LLE swelling and redness for the last 3 days.  He states initially he noticed a boil" to his left knee.  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  fluctuance to the area,redness, swelling, and pain. subjective fever and chills,nausea. Tried to drain the boil himself with minimal pus drainage " at that time.   Discharge summary     Provider, please specify diagnosis or diagnoses associated with above clinical findings.      [   ] Sepsis   [   ] Severe Sepsis with Acute Organ Dysfunction/Failure (please specify  organ dysfunction/failure): ___________________   [ x  ] Other Infectious Disease (please specify): ___________cellulitis______________________   [   ] Other: __________________________________   [  ]  Clinically Undetermined         Please document in your progress notes daily for the duration of treatment until resolved and include in your discharge summary.

## 2019-04-09 LAB
BACTERIA BLD CULT: NORMAL
BACTERIA BLD CULT: NORMAL
BACTERIA SPEC ANAEROBE CULT: NORMAL

## 2023-11-29 DIAGNOSIS — N43.3 HYDROCELE, UNSPECIFIED: ICD-10-CM

## 2023-11-29 DIAGNOSIS — N20.0 URIC ACID NEPHROLITHIASIS: ICD-10-CM

## 2023-11-29 DIAGNOSIS — R10.10 UPPER ABDOMINAL PAIN: Primary | ICD-10-CM

## 2023-11-30 ENCOUNTER — HOSPITAL ENCOUNTER (OUTPATIENT)
Dept: RADIOLOGY | Facility: HOSPITAL | Age: 43
Discharge: HOME | End: 2023-11-30
Attending: SPECIALIST
Payer: COMMERCIAL

## 2023-11-30 DIAGNOSIS — N43.3 HYDROCELE, UNSPECIFIED: ICD-10-CM

## 2023-11-30 DIAGNOSIS — N20.0 URIC ACID NEPHROLITHIASIS: ICD-10-CM

## 2023-11-30 DIAGNOSIS — R10.10 UPPER ABDOMINAL PAIN: ICD-10-CM

## 2023-11-30 PROCEDURE — 76870 US EXAM SCROTUM: CPT | Mod: TC

## 2023-11-30 PROCEDURE — 74018 RADEX ABDOMEN 1 VIEW: CPT | Mod: TC

## 2023-11-30 PROCEDURE — 25500020 PHARM REV CODE 255: Performed by: SPECIALIST

## 2023-11-30 PROCEDURE — 74178 CT ABD&PLV WO CNTR FLWD CNTR: CPT | Mod: TC

## 2023-11-30 RX ADMIN — IOHEXOL 100 ML: 350 INJECTION, SOLUTION INTRAVENOUS at 04:11

## (undated) DEVICE — GOWN B1 X-LG X-LONG

## (undated) DEVICE — DRESSING DERMACEA SPNG 10S

## (undated) DEVICE — SEE MEDLINE ITEM 146231

## (undated) DEVICE — TRAY DRY SPONGE SCRUB W FOAM

## (undated) DEVICE — GLOVE 7.5 PROTEXIS PI BLUE

## (undated) DEVICE — PACK ARTHROSCOPY W/ISO BAC

## (undated) DEVICE — ELECTRODE REM PLYHSV RETURN 9

## (undated) DEVICE — SOL PVP-I SCRUB 7.5% 4OZ

## (undated) DEVICE — SOL 9P NACL IRR PIC IL

## (undated) DEVICE — SWAB CULTURETTE SINGLE

## (undated) DEVICE — SEE MEDLINE ITEM 157216

## (undated) DEVICE — GLOVE PROTEXIS PI CLASSIC 7.5

## (undated) DEVICE — SEE MEDLINE ITEM 157131

## (undated) DEVICE — GLOVE PROTEXIS PI CLASSIC 8.0

## (undated) DEVICE — COLLECTOR SPECIMEN ANAEROBIC

## (undated) DEVICE — PACK CUSTOM UNIV BASIN SLI

## (undated) DEVICE — DRESSING N ADH OIL EMUL 3X3

## (undated) DEVICE — SLEEVE SCD EXPRESS CALF MEDIUM